# Patient Record
(demographics unavailable — no encounter records)

---

## 2025-05-29 NOTE — PROCEDURE
[Cervical Pap Smear] : cervical Pap smear [Liquid Base] : liquid base [Tolerated Well] : the patient tolerated the procedure well [No Complications] : there were no complications [Transvaginal OB Sonogram] : Transvaginal OB Sonogram [Intrauterine Pregnancy] : intrauterine pregnancy [Yolk Sac] : yolk sac present [Fetal Heart] : fetal heart present [Current GA by Sonogram: ___ (wks)] : Current GA by Sonogram: [unfilled]Uwks [___ day(s)] : [unfilled] days [FreeTextEntry1] : +FH, crl c/w dates

## 2025-05-29 NOTE — HISTORY OF PRESENT ILLNESS
[FreeTextEntry1] : 31 year old female  at 7 wks 6 days presents for amenorrhea. LMP 25. Pt is doing well with no complaints. Pt is followed for left breast nodule. Sono showed positive FH with CRL consistent with dates.   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

## 2025-05-29 NOTE — REASON FOR VISIT
Occupational Therapy Visit    Visit Type: Daily Treatment Note  Visit: 4  Referring Provider: Phillip Leon MD  Medical Diagnosis (from order): S62.615P - Displaced fracture of proximal phalanx of left ring finger with malunion   Patient alert and oriented X3.    SUBJECTIVE                                                                                                               Pt reported no change with finger ROM.     Pain / Symptoms  - Pain rating (out of 10): Current: 0 ; Best: 0; Worst: 0     OBJECTIVE                                                                                                                                   Range of Motion (ROM) (hand specific)  (degrees unless noted; active unless noted; norms in ( ); negative=lacking to 0, positive=beyond 0)  Ring Finger:    - PIP Joint Flexion (100):         Left: 105         Right: 95    - PIP Joint Extension (0):         Right: -10    - DIP Flexion (70-90):         Left: 85         Right: 80    Orthosis  Type: left  Orthosis: Relative Motion (relative flexion orthosis)   1 purpose: prevent RF MP hyperextension to allow for PIP extension without compensation.   - Wear schedule: daytime, remove for hygiene and remove for exercises  Patient instructed in: purpose and care of orthosis; precautions and wearing schedule of orthosis; guideline to contact clinic with concerns such as pressure points, redness, skin irritation, poor fit         Treatment     Therapeutic Exercise  - Seated Finger DIP Flexion AROM with Blocking    - Hand AROM Reverse Blocking    - Finger PIP Flexion Extension with Blocking   - A/PROM L ring finger with place and hold     orthotic management  PIP extension splint re-dipped in splint pan and remolded for increased PIP extension stretch    See above for relative motion splint  Skilled input: verbal instruction/cues, tactile instruction/cues and demonstration    Writer verbally educated and received verbal consent for hand  placement, positioning of patient, and techniques to be performed today from patient for hand placement and palpation for techniques and therapist position for techniques as described above and how they are pertinent to the patient's plan of care.  Home Exercise Program  Access Code: [email or web address]  URL: [email or web address]  Date: 11/20/2024  Prepared by: Varsha Aceves    Exercises  - Seated Finger DIP Flexion AROM with Blocking  - 1 x daily - 7 x weekly - 3 sets - 10 reps - 3 hold  - Hand AROM Reverse Blocking  - 1 x daily - 7 x weekly - 3 sets - 10 reps - 3 hold  - Finger PIP Flexion Extension with Blocking  - 1 x daily - 7 x weekly - 3 sets - 10 reps - 3 hold  - Finger Strengthening: Hook and Snow Plow in Putty  - 1 x daily - 7 x weekly - 3 sets - 10 reps - 3 hold      ASSESSMENT                                                                                                            No change with finger extension pretreatment this date. Fabricated relative flexion orthosis to promote increased PIP extension. Pts L ring finger PIP extension with relative motion splint on improved to -9. Pt encouraged to wear splint throughout the day.   Education:   - Results of above outlined education: Verbalizes understanding and Demonstrates understanding       Therapy procedure time and total treatment time can be found documented on the Time Entry flowsheet   [Amenorrhea] : amenorrhea

## 2025-05-29 NOTE — END OF VISIT
[FreeTextEntry3] : I, Ashly Williamson, acted as a scribe on behalf of Dr. Sandra Montes M.D. on 05/29/2025.    All medical entries made by the scribe were at my Dr. Sandra Montes M.D direction and personally dictated by me on 05/29/2025. I have reviewed the chart and agree that the record accurately reflects my personal performance of the history, physical exam, assessment and plan. I have also personally directed, reviewed, and agreed with the chart.

## 2025-05-29 NOTE — PLAN
[FreeTextEntry1] :  31 year old female for amenorrhea.   HCM: -Pap smear today - Sono consistent w/ LMP - OB bloods done  - Rx given for left breast sono  - Check factor 11 activity levels in third trimester  - RTO 4 weeks NT/NIPS